# Patient Record
Sex: FEMALE | Race: AMERICAN INDIAN OR ALASKA NATIVE
[De-identification: names, ages, dates, MRNs, and addresses within clinical notes are randomized per-mention and may not be internally consistent; named-entity substitution may affect disease eponyms.]

---

## 2018-07-07 ENCOUNTER — HOSPITAL ENCOUNTER (EMERGENCY)
Dept: HOSPITAL 5 - ED | Age: 53
Discharge: LEFT BEFORE DISCHARGE | End: 2018-07-07
Payer: MEDICAID

## 2018-07-07 VITALS — DIASTOLIC BLOOD PRESSURE: 84 MMHG | SYSTOLIC BLOOD PRESSURE: 117 MMHG

## 2018-07-07 DIAGNOSIS — Z90.49: ICD-10-CM

## 2018-07-07 DIAGNOSIS — Z98.51: ICD-10-CM

## 2018-07-07 DIAGNOSIS — R53.83: Primary | ICD-10-CM

## 2018-07-07 DIAGNOSIS — Z90.89: ICD-10-CM

## 2018-07-07 DIAGNOSIS — K21.9: ICD-10-CM

## 2018-07-07 PROCEDURE — 99282 EMERGENCY DEPT VISIT SF MDM: CPT

## 2018-07-07 NOTE — EMERGENCY DEPARTMENT REPORT
ED General Adult HPI





- General


Chief complaint: Back Pain/Injury


Stated complaint: POSS FLU/VIRAL BUG


Time Seen by Provider: 18 15:01


Source: patient


Mode of arrival: Ambulatory


Limitations: No Limitations





- History of Present Illness


Initial comments: 





53-year-old female 10 days ago donated some plasma felt like it was a clean 

facility that did good sterile prep with fresh needles however is not sure she 

came down with something, she's been having some type of body aches off and on 

ever since now it's into her back, she does relate that she did eat something 

at the buffet the next day which may have started it because she felt ill after 

that she denies any nausea or diarrhea no fever no black or bloody stool no 

stiff neck, no recent travel, no tick bites or mosquito bites no other exposures

, no rash


-: Gradual, days(s)


Associated Symptoms: malaise, weakness.  denies: headaches, loss of appetite, 

rash, seizure, shortness of breath, syncope





- Related Data


 Home Medications











 Medication  Instructions  Recorded  Confirmed  Last Taken


 


Omeprazole [Prilosec] 40 mg PO BID 10/09/14 10/09/14 Unknown


 


levETIRAcetam [Keppra] 750 mg PO BID 10/09/14 10/09/14 Unknown











 Allergies











Allergy/AdvReac Type Severity Reaction Status Date / Time


 


No Known Allergies Allergy   Verified 18 12:49














ED Review of Systems


ROS: 


Stated complaint: POSS FLU/VIRAL BUG


Other details as noted in HPI





Comment: All other systems reviewed and negative


Constitutional: malaise.  denies: fever


Eyes: denies: eye discharge, vision change


ENT: denies: dental pain, hearing loss, epistaxis


Respiratory: denies: shortness of breath, SOB with exertion, SOB at rest, 

stridor


Cardiovascular: denies: chest pain, palpitations, dyspnea on exertion, orthopnea

, edema, syncope, paroxysmal nocturnal dyspnea


Gastrointestinal: denies: nausea, vomiting, diarrhea, constipation, hematemesis

, melena, hematochezia


Musculoskeletal: denies: joint swelling, arthralgia


Neurological: denies: headache, weakness, numbness, paresthesias, confusion, 

abnormal gait, vertigo


Psychiatric: denies: auditory hallucinations, visual hallucinations, homicidal 

thoughts, suicidal thoughts





ED Past Medical Hx





- Past Medical History


Hx GERD: Yes


Hx Seizures: Yes





- Surgical History


Hx Open Heart Surgery: Yes


Hx Appendectomy: Yes


Additional Surgical History: Gastric bypass, tonsillectomy, , tubal 

ligation





- Social History


Smoking Status: Never Smoker


Substance Use Type: None





- Medications


Home Medications: 


 Home Medications











 Medication  Instructions  Recorded  Confirmed  Last Taken  Type


 


Omeprazole [Prilosec] 40 mg PO BID 10/09/14 10/09/14 Unknown History


 


levETIRAcetam [Keppra] 750 mg PO BID 10/09/14 10/09/14 Unknown History














ED Physical Exam





- General


Limitations: No Limitations


General appearance: alert, in no apparent distress, anxious





- Head


Head exam: Present: atraumatic, normocephalic





- Eye


Eye exam: Present: PERRL, EOMI





- ENT


ENT exam: Present: normal exam, normal orophraynx





- Neck


Neck exam: Present: normal inspection.  Absent: meningismus





- Respiratory


Respiratory exam: Present: normal lung sounds bilaterally.  Absent: respiratory 

distress, wheezes, rales, rhonchi, stridor, chest wall tenderness, accessory 

muscle use, decreased breath sounds, prolonged expiratory





- Cardiovascular


Cardiovascular Exam: Present: regular rate, normal rhythm.  Absent: bradycardia

, tachycardia, irregular rhythm, normal heart sounds, systolic murmur, 

diastolic murmur, rubs, gallop





- GI/Abdominal


GI/Abdominal exam: Present: soft.  Absent: distended, tenderness, guarding, 

rebound, rigid, mass, pulsatile mass





- Extremities Exam


Extremities exam: Present: normal inspection, normal capillary refill, other (6 

site looks clean and dry.  No lymphangitis no warmth or erythema no compartment 

syndrome).  Absent: pedal edema, joint swelling





- Back Exam


Back exam: Present: normal inspection.  Absent: CVA tenderness (L), muscle spasm

, paraspinal tenderness, vertebral tenderness





- Neurological Exam


Neurological exam: Present: alert, oriented X3, CN II-XII intact, normal gait.  

Absent: motor sensory deficit





- Psychiatric


Psychiatric exam: Present: anxious.  Absent: homicidal ideation, suicidal 

ideation





ED Course


 Vital Signs











  18





  12:50


 


Temperature 98.4 F


 


Pulse Rate 95 H


 


Respiratory 18





Rate 


 


Blood Pressure 117/84


 


O2 Sat by Pulse 96





Oximetry 














ED Medical Decision Making





- Medical Decision Making





Patient with vague malaise she's not sure if it's related to giving plasma or 

from something she ate all of this is gone on for about 7-10 days, I did 

suggest to her that we will get a urine test and a battery of some blood work 

just to evaluate for leukocytosis left shift and possible infection she is 

refusing this at this time, unclear what the etiology is and she is refusing 

further evaluation she is nontoxic alert and oriented 3 no process she is not 

intoxicated and does have capacity she is refusing further evaluation at this 

time


Critical care attestation.: 


If time is entered above; I have spent that time in minutes in the direct care 

of this critically ill patient, excluding procedure time.








ED Disposition


Clinical Impression: 


 Malaise





Disposition: DC-07 LEFT AGAINST MED ADVICE


Is pt being admited?: No


Condition: Stable


Instructions:  Needle Stick Injuries (ED), Weakness (ED)


Additional Instructions: 


Return if new alarming symptoms or see their doctor listed


Referrals: 


PRIMARY CARE,MD [Primary Care Provider] - 3-5 Days


Forms:  AMA Form


Time of Disposition: 15:50

## 2019-11-14 ENCOUNTER — HOSPITAL ENCOUNTER (EMERGENCY)
Dept: HOSPITAL 5 - ED | Age: 54
Discharge: HOME | End: 2019-11-14
Payer: SELF-PAY

## 2019-11-14 VITALS — DIASTOLIC BLOOD PRESSURE: 79 MMHG | SYSTOLIC BLOOD PRESSURE: 119 MMHG
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
## 2019-11-14 DIAGNOSIS — K21.9: ICD-10-CM
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
## 2019-11-14 DIAGNOSIS — S16.1XXA: Primary | ICD-10-CM
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
## 2019-11-14 PROCEDURE — 99282 EMERGENCY DEPT VISIT SF MDM: CPT

## 2019-11-14 NOTE — EMERGENCY DEPARTMENT REPORT
ED Back Pain/Injury HPI





- General


Chief Complaint: Back Pain/Injury


Stated Complaint: BI SHOULDER/NECK PAIN


Time Seen by Provider: 19 15:33


Source: patient


Limitations: No Limitations





- History of Present Illness


Initial Comments: 





This is a 54-year-old female nontoxic, well nourished in appearance, no acute 

signs of distress presents to the ED with c/o of intermittent upper back pain x1

month.  Patient denies any trauma.  Denies any bladder or bowel instability.  

Patient denies any urinary symptoms.  Denies any fever, chills, nausea, 

vomiting, headache, stiff neck, chest pain or shortness of breath.  Patient 

denies any numbness or tingling.  Denies any allergies.  





MD Complaint: back pain


Similar Symptoms Previously: Yes


Radiation: none


Severity: mild


Severity scale (0 -10): 8


Quality: aching


Consistency: intermittent


Improves With: immobilization, sitting upright


Worsens With: movement, walking


Associated Symptoms: denies other symptoms.  denies: confusion, weakness, chest 

pain, numbness, difficulty walking, cough, difficulty urinating, diaphoresis, 

incontinence, fever/chills, constipation, headaches, abdominal pain, loss of 

appetite, malaise, nausea/vomiting, rash, seizure, shortness of breath, syncope





- Related Data


                                Home Medications











 Medication  Instructions  Recorded  Confirmed  Last Taken


 


Omeprazole [Prilosec] 40 mg PO BID 10/09/14 10/09/14 Unknown


 


levETIRAcetam [Keppra] 750 mg PO BID 10/09/14 10/09/14 Unknown








                                  Previous Rx's











 Medication  Instructions  Recorded  Last Taken  Type


 


Cyclobenzaprine [Flexeril] 10 mg PO QHS PRN #10 tablet 19 Unknown Rx


 


Naproxen 500 mg PO Q8H PRN #20 tablet 19 Unknown Rx











                                    Allergies











Allergy/AdvReac Type Severity Reaction Status Date / Time


 


No Known Allergies Allergy   Verified 18 12:49














ED Review of Systems


ROS: 


Stated complaint: BI SHOULDER/NECK PAIN


Other details as noted in HPI





Constitutional: denies: chills, fever


Eyes: denies: eye pain, eye discharge, vision change


ENT: denies: ear pain, throat pain


Respiratory: denies: cough, shortness of breath, wheezing


Cardiovascular: denies: chest pain, palpitations


Endocrine: no symptoms reported


Gastrointestinal: denies: abdominal pain, nausea, diarrhea


Genitourinary: denies: urgency, dysuria, discharge


Musculoskeletal: back pain.  denies: joint swelling, arthralgia


Skin: denies: rash, lesions


Neurological: denies: headache, weakness, paresthesias


Psychiatric: denies: anxiety, depression


Hematological/Lymphatic: denies: easy bleeding, easy bruising





ED Past Medical Hx





- Past Medical History


Hx GERD: Yes


Hx Seizures: Yes





- Surgical History


Hx Open Heart Surgery: Yes


Hx Appendectomy: Yes


Additional Surgical History: Gastric bypass, tonsillectomy, , tubal 

ligation





- Social History


Smoking Status: Never Smoker





- Medications


Home Medications: 


                                Home Medications











 Medication  Instructions  Recorded  Confirmed  Last Taken  Type


 


Omeprazole [Prilosec] 40 mg PO BID 10/09/14 10/09/14 Unknown History


 


levETIRAcetam [Keppra] 750 mg PO BID 10/09/14 10/09/14 Unknown History


 


Cyclobenzaprine [Flexeril] 10 mg PO QHS PRN #10 tablet 19  Unknown Rx


 


Naproxen 500 mg PO Q8H PRN #20 tablet 19  Unknown Rx














ED Physical Exam





- General


Limitations: No Limitations


General appearance: alert, in no apparent distress





- Head


Head exam: Present: atraumatic, normocephalic





- Neck


Neck exam: Present: normal inspection, full ROM.  Absent: tenderness, 

meningismus, lymphadenopathy





- Respiratory


Respiratory exam: Present: normal lung sounds bilaterally.  Absent: respiratory 

distress, wheezes, rales, rhonchi, stridor, chest wall tenderness, accessory 

muscle use, decreased breath sounds, prolonged expiratory





- Cardiovascular


Cardiovascular Exam: Present: regular rate, normal rhythm, normal heart sounds. 

 Absent: irregular rhythm, systolic murmur, diastolic murmur, rubs, gallop





- Extremities Exam


Extremities exam: Present: normal inspection, full ROM





- Back Exam


Back exam: Present: normal inspection, full ROM, paraspinal tenderness (cervical

 paraspinal area).  Absent: tenderness, CVA tenderness (R), CVA tenderness (L), 

muscle spasm, vertebral tenderness, rash noted





- Neurological Exam


Neurological exam: Present: alert, oriented X3, normal gait





- Psychiatric


Psychiatric exam: Present: normal affect, normal mood





- Skin


Skin exam: Present: warm, dry, intact, normal color.  Absent: rash





ED Course





                                   Vital Signs











  19





  15:22


 


Temperature 97.6 F


 


Pulse Rate 96 H


 


Respiratory 18





Rate 


 


Blood Pressure 119/79


 


O2 Sat by Pulse 97





Oximetry 














- Reevaluation(s)


Reevaluation #1: 





19 15:40


Patient is speaking in full sentences with no signs of distress noted.





ED Medical Decision Making





- Medical Decision Making





This is a 54-year-old female that presents with upper back strain.  Patient is 

stable was examined by me.  There is no spinal tenderness.  There is no cauda 

equina syndrome during examination.  No bladder or bowel instability. Patient is

 discharged with muscle relaxant and NSAID.  Patient was instructed not to 

operate any machinery while taking muscle relaxant as they cause her drowsiness.

  Patient was referred to Follow-up with a primary care doctor in 3-5 days or if

 symptoms worsen and continue return to emergency room as soon as possible.  At 

time of discharge, the patient does not seem toxic or ill in appearance.  No 

acute signs of distress noted.  Patient agrees to discharge treatment plan of 

care.  No further questions noted by the patient.





Critical care attestation.: 


If time is entered above; I have spent that time in minutes in the direct care 

of this critically ill patient, excluding procedure time.








ED Disposition


Clinical Impression: 


Cervical muscle strain


Qualifiers:


 Encounter type: initial encounter Qualified Code(s): S16.1XXA - Strain of 

muscle, fascia and tendon at neck level, initial encounter





Disposition: - TO HOME OR SELFCARE


Is pt being admited?: No


Does the pt Need Aspirin: No


Condition: Stable


Instructions:  Muscle Strain (ED), Cyclobenzaprine (By mouth)


Additional Instructions: 


Follow-up with a primary care doctor in 3-5 days or if symptoms worsen and 

continue return to emergency room as soon as possible.  


Prescriptions: 


Cyclobenzaprine [Flexeril] 10 mg PO QHS PRN #10 tablet


 PRN Reason: Muscle Spasm


Naproxen 500 mg PO Q8H PRN #20 tablet


 PRN Reason: Pain, Moderate (4-6)


Referrals: 


PRIMARY CARE,MD [Referring] - 3-5 Days


ANDREA COATS MD [Staff Physician] - 3-5 Days


[facility] [Outside] - 3-5 Days


[facility] [Outside] - 3-5 Days


Forms:  Work/School Release Form(ED)